# Patient Record
Sex: MALE | Race: ASIAN | NOT HISPANIC OR LATINO | Employment: FULL TIME | URBAN - METROPOLITAN AREA
[De-identification: names, ages, dates, MRNs, and addresses within clinical notes are randomized per-mention and may not be internally consistent; named-entity substitution may affect disease eponyms.]

---

## 2018-06-11 ENCOUNTER — HISTORICAL (OUTPATIENT)
Dept: ADMINISTRATIVE | Facility: HOSPITAL | Age: 33
End: 2018-06-11

## 2018-06-11 LAB
ABS NEUT (OLG): 3.8
ALBUMIN SERPL-MCNC: 4.8 GM/DL (ref 3.4–5)
ALBUMIN/GLOB SERPL: 1.71 {RATIO} (ref 1.5–2.5)
ALP SERPL-CCNC: 66 UNIT/L (ref 38–126)
ALT SERPL-CCNC: 64 UNIT/L (ref 7–52)
AST SERPL-CCNC: 33 UNIT/L (ref 15–37)
BILIRUB SERPL-MCNC: 0.7 MG/DL (ref 0.2–1)
BILIRUBIN DIRECT+TOT PNL SERPL-MCNC: 0.1 MG/DL (ref 0–0.5)
BILIRUBIN DIRECT+TOT PNL SERPL-MCNC: 0.6 MG/DL
BUN SERPL-MCNC: 11 MG/DL (ref 7–18)
CALCIUM SERPL-MCNC: 9 MG/DL (ref 8.5–10)
CHLORIDE SERPL-SCNC: 104 MMOL/L (ref 98–107)
CHOLEST SERPL-MCNC: 206 MG/DL (ref 0–200)
CHOLEST/HDLC SERPL: 4.8 {RATIO}
CO2 SERPL-SCNC: 28 MMOL/L (ref 21–32)
CREAT SERPL-MCNC: 0.82 MG/DL (ref 0.6–1.3)
ERYTHROCYTE [DISTWIDTH] IN BLOOD BY AUTOMATED COUNT: 13.5 % (ref 11.5–17)
GLOBULIN SER-MCNC: 2.8 GM/DL (ref 1.2–3)
GLUCOSE SERPL-MCNC: 102 MG/DL (ref 74–106)
HCT VFR BLD AUTO: 44.9 % (ref 42–52)
HDLC SERPL-MCNC: 43 MG/DL (ref 35–60)
HGB BLD-MCNC: 15.6 GM/DL (ref 14–18)
LDLC SERPL CALC-MCNC: 91 MG/DL (ref 0–129)
LYMPHOCYTES # BLD AUTO: 1.5 X10(3)/MCL (ref 0.6–3.4)
LYMPHOCYTES NFR BLD AUTO: 26.7 % (ref 13–40)
MCH RBC QN AUTO: 31.6 PG (ref 27–31.2)
MCHC RBC AUTO-ENTMCNC: 35 GM/DL (ref 32–36)
MCV RBC AUTO: 91 FL (ref 80–94)
MONOCYTES # BLD AUTO: 0.5 X10(3)/MCL (ref 0–1.8)
MONOCYTES NFR BLD AUTO: 7.8 % (ref 0.1–24)
NEUTROPHILS NFR BLD AUTO: 65.5 % (ref 47–80)
PLATELET # BLD AUTO: 254 X10(3)/MCL (ref 130–400)
PMV BLD AUTO: 9 FL
POTASSIUM SERPL-SCNC: 3.9 MMOL/L (ref 3.5–5.1)
PROT SERPL-MCNC: 7.6 GM/DL (ref 6.4–8.2)
RBC # BLD AUTO: 4.94 X10(6)/MCL (ref 4.7–6.1)
SODIUM SERPL-SCNC: 137 MMOL/L (ref 136–145)
TRIGL SERPL-MCNC: 340 MG/DL (ref 30–150)
TSH SERPL-ACNC: 1.64 MIU/ML (ref 0.35–4.94)
VLDLC SERPL CALC-MCNC: 68 MG/DL
WBC # SPEC AUTO: 5.8 X10(3)/MCL (ref 4.5–11.5)

## 2018-07-03 ENCOUNTER — HISTORICAL (OUTPATIENT)
Dept: ADMINISTRATIVE | Facility: HOSPITAL | Age: 33
End: 2018-07-03

## 2018-07-03 LAB
ALBUMIN SERPL-MCNC: 4.8 GM/DL (ref 3.4–5)
ALP SERPL-CCNC: 60 UNIT/L (ref 38–126)
ALT SERPL-CCNC: 90 UNIT/L (ref 7–52)
AST SERPL-CCNC: 34 UNIT/L (ref 15–37)
BILIRUB SERPL-MCNC: 0.8 MG/DL (ref 0.2–1)
BILIRUBIN DIRECT+TOT PNL SERPL-MCNC: 0.2 MG/DL (ref 0–0.5)
BILIRUBIN DIRECT+TOT PNL SERPL-MCNC: 0.6 MG/DL
PROT SERPL-MCNC: 7.5 GM/DL (ref 6.4–8.2)

## 2018-07-12 ENCOUNTER — HISTORICAL (OUTPATIENT)
Dept: LAB | Facility: HOSPITAL | Age: 33
End: 2018-07-12

## 2018-07-12 LAB
HAV IGM SERPL QL IA: NEGATIVE
HBV CORE IGM SERPL QL IA: NEGATIVE
HBV SURFACE AG SERPL QL IA: NEGATIVE
HCV AB SERPL QL IA: NEGATIVE
HEPATITIS PANEL INTERP: NORMAL

## 2019-07-16 ENCOUNTER — HISTORICAL (OUTPATIENT)
Dept: ADMINISTRATIVE | Facility: HOSPITAL | Age: 34
End: 2019-07-16

## 2019-07-16 LAB
ALBUMIN SERPL-MCNC: 4.9 GM/DL (ref 3.4–5)
ALBUMIN/GLOB SERPL: 1.6 RATIO (ref 1.1–2)
ALP SERPL-CCNC: 70 UNIT/L (ref 50–136)
ALT SERPL-CCNC: 33 UNIT/L (ref 12–78)
AST SERPL-CCNC: 11 UNIT/L (ref 15–37)
BILIRUB SERPL-MCNC: 1.4 MG/DL (ref 0.2–1)
BILIRUBIN DIRECT+TOT PNL SERPL-MCNC: 0.3 MG/DL (ref 0–0.5)
BILIRUBIN DIRECT+TOT PNL SERPL-MCNC: 1.1 MG/DL (ref 0–0.8)
BUN SERPL-MCNC: 11 MG/DL (ref 7–18)
CALCIUM SERPL-MCNC: 9.7 MG/DL (ref 8.5–10.1)
CHLORIDE SERPL-SCNC: 108 MMOL/L (ref 98–107)
CO2 SERPL-SCNC: 30 MMOL/L (ref 21–32)
CREAT SERPL-MCNC: 0.96 MG/DL (ref 0.7–1.3)
GLOBULIN SER-MCNC: 3 GM/DL (ref 2.4–3.5)
GLUCOSE SERPL-MCNC: 89 MG/DL (ref 74–106)
INR PPP: 1.1 (ref 0–1.3)
POTASSIUM SERPL-SCNC: 4.8 MMOL/L (ref 3.5–5.1)
PROT SERPL-MCNC: 7.9 GM/DL (ref 6.4–8.2)
PROTHROMBIN TIME: 14.3 SECOND(S) (ref 12–14)
SODIUM SERPL-SCNC: 144 MMOL/L (ref 136–145)

## 2020-01-20 ENCOUNTER — HISTORICAL (OUTPATIENT)
Dept: ADMINISTRATIVE | Facility: HOSPITAL | Age: 35
End: 2020-01-20

## 2020-01-20 LAB
ALBUMIN SERPL-MCNC: 4.4 GM/DL (ref 3.4–5)
ALBUMIN/GLOB SERPL: 1.4 {RATIO}
ALP SERPL-CCNC: 79 UNIT/L (ref 50–136)
ALT SERPL-CCNC: 91 UNIT/L (ref 12–78)
AST SERPL-CCNC: 20 UNIT/L (ref 15–37)
BILIRUB SERPL-MCNC: 1.4 MG/DL (ref 0.2–1)
BILIRUBIN DIRECT+TOT PNL SERPL-MCNC: 0.2 MG/DL (ref 0–0.2)
BILIRUBIN DIRECT+TOT PNL SERPL-MCNC: 1.2 MG/DL (ref 0–0.8)
BUN SERPL-MCNC: 13 MG/DL (ref 7–18)
CALCIUM SERPL-MCNC: 9.1 MG/DL (ref 8.5–10.1)
CHLORIDE SERPL-SCNC: 106 MMOL/L (ref 98–107)
CO2 SERPL-SCNC: 28 MMOL/L (ref 21–32)
CREAT SERPL-MCNC: 1.01 MG/DL (ref 0.7–1.3)
GLOBULIN SER-MCNC: 3.1 GM/DL (ref 2.4–3.5)
GLUCOSE SERPL-MCNC: 88 MG/DL (ref 74–106)
POTASSIUM SERPL-SCNC: 3.8 MMOL/L (ref 3.5–5.1)
PROT SERPL-MCNC: 7.5 GM/DL (ref 6.4–8.2)
SODIUM SERPL-SCNC: 140 MMOL/L (ref 136–145)

## 2020-02-17 ENCOUNTER — HISTORICAL (OUTPATIENT)
Dept: ADMINISTRATIVE | Facility: HOSPITAL | Age: 35
End: 2020-02-17

## 2020-02-17 LAB
CK SERPL-CCNC: 576 UNIT/L (ref 39–308)
IRON SATN MFR SERPL: 37.4 % (ref 20–50)
IRON SERPL-MCNC: 142 MCG/DL (ref 50–175)
TIBC SERPL-MCNC: 380 MCG/DL (ref 250–450)
TRANSFERRIN SERPL-MCNC: 292 MG/DL (ref 200–360)

## 2020-10-12 ENCOUNTER — HISTORICAL (OUTPATIENT)
Dept: ADMINISTRATIVE | Facility: HOSPITAL | Age: 35
End: 2020-10-12

## 2020-10-12 LAB
ABS NEUT (OLG): 4.7 X10(3)/MCL (ref 2.1–9.2)
ALBUMIN SERPL-MCNC: 5.1 GM/DL (ref 3.4–5)
ALBUMIN/GLOB SERPL: 2.12 {RATIO} (ref 1.5–2.5)
ALP SERPL-CCNC: 65 UNIT/L (ref 38–126)
ALT SERPL-CCNC: 32 UNIT/L (ref 7–52)
ANTINUCLEAR ANTIBODY SCREEN (OHS): NEGATIVE
AST SERPL-CCNC: 21 UNIT/L (ref 15–37)
BILIRUB SERPL-MCNC: 1.2 MG/DL (ref 0.2–1)
BILIRUBIN DIRECT+TOT PNL SERPL-MCNC: 0.2 MG/DL (ref 0–0.5)
BILIRUBIN DIRECT+TOT PNL SERPL-MCNC: 1 MG/DL
BUN SERPL-MCNC: 8 MG/DL (ref 7–18)
CALCIUM SERPL-MCNC: 9.4 MG/DL (ref 8.5–10.1)
CHLORIDE SERPL-SCNC: 103 MMOL/L (ref 98–107)
CHOLEST SERPL-MCNC: 189 MG/DL (ref 0–200)
CHOLEST/HDLC SERPL: 3.5 {RATIO}
CK SERPL-CCNC: 153 UNIT/L (ref 21–232)
CO2 SERPL-SCNC: 25 MMOL/L (ref 21–32)
CREAT SERPL-MCNC: 0.8 MG/DL (ref 0.6–1.3)
DSDNA ANTIBODY (OHS): NEGATIVE
ERYTHROCYTE [DISTWIDTH] IN BLOOD BY AUTOMATED COUNT: 13.2 % (ref 11.5–17)
GLOBULIN SER-MCNC: 2.4 GM/DL (ref 1.2–3)
GLUCOSE SERPL-MCNC: 98 MG/DL (ref 74–106)
HCT VFR BLD AUTO: 44.9 % (ref 42–52)
HDLC SERPL-MCNC: 54 MG/DL (ref 35–60)
HGB BLD-MCNC: 15.7 GM/DL (ref 14–18)
IRON SATN MFR SERPL: 36 % (ref 20–50)
IRON SERPL-MCNC: 123 UG/DL (ref 65–175)
LDLC SERPL CALC-MCNC: 129 MG/DL (ref 0–129)
LYMPHOCYTES # BLD AUTO: 0.9 X10(3)/MCL (ref 0.6–3.4)
LYMPHOCYTES NFR BLD AUTO: 15.1 % (ref 13–40)
MCH RBC QN AUTO: 31.2 PG (ref 27–31.2)
MCHC RBC AUTO-ENTMCNC: 35 GM/DL (ref 32–36)
MCV RBC AUTO: 89 FL (ref 80–94)
MONOCYTES # BLD AUTO: 0.4 X10(3)/MCL (ref 0.1–1.3)
MONOCYTES NFR BLD AUTO: 6.9 % (ref 0.1–24)
NEUTROPHILS NFR BLD AUTO: 78 % (ref 47–80)
PLATELET # BLD AUTO: 247 X10(3)/MCL (ref 130–400)
PMV BLD AUTO: 9.1 FL (ref 9.4–12.4)
POTASSIUM SERPL-SCNC: 3.8 MMOL/L (ref 3.5–5.1)
PROT SERPL-MCNC: 7.5 GM/DL (ref 6.4–8.2)
RBC # BLD AUTO: 5.04 X10(6)/MCL (ref 4.7–6.1)
SODIUM SERPL-SCNC: 140 MMOL/L (ref 136–145)
TIBC SERPL-MCNC: 221 UG/DL (ref 69–240)
TIBC SERPL-MCNC: 344 UG/DL (ref 250–450)
TRANSFERRIN SERPL-MCNC: 299 MG/DL (ref 174–364)
TRIGL SERPL-MCNC: 78 MG/DL (ref 30–150)
TSH SERPL-ACNC: 1.7 MIU/ML (ref 0.35–4.94)
VLDLC SERPL CALC-MCNC: 15.6 MG/DL
WBC # SPEC AUTO: 6 X10(3)/MCL (ref 4.5–11.5)

## 2021-06-24 ENCOUNTER — HISTORICAL (OUTPATIENT)
Dept: ADMINISTRATIVE | Facility: HOSPITAL | Age: 36
End: 2021-06-24

## 2021-06-24 LAB
ALBUMIN SERPL-MCNC: 5 GM/DL (ref 3.4–5)
ALBUMIN/GLOB SERPL: 1.85 {RATIO} (ref 1.5–2.5)
ALP SERPL-CCNC: 65 UNIT/L (ref 38–126)
ALT SERPL-CCNC: 197 UNIT/L (ref 7–52)
AST SERPL-CCNC: 81 UNIT/L (ref 15–37)
BILIRUB SERPL-MCNC: 1.2 MG/DL (ref 0.2–1)
BILIRUBIN DIRECT+TOT PNL SERPL-MCNC: 0.3 MG/DL (ref 0–0.5)
BILIRUBIN DIRECT+TOT PNL SERPL-MCNC: 0.9 MG/DL
BUN SERPL-MCNC: 15 MG/DL (ref 7–18)
CALCIUM SERPL-MCNC: 9.5 MG/DL (ref 8.5–10.1)
CHLORIDE SERPL-SCNC: 104 MMOL/L (ref 98–107)
CO2 SERPL-SCNC: 26 MMOL/L (ref 21–32)
CREAT SERPL-MCNC: 1 MG/DL (ref 0.6–1.3)
GLOBULIN SER-MCNC: 2.8 GM/DL (ref 1.2–3)
GLUCOSE SERPL-MCNC: 88 MG/DL (ref 74–106)
POTASSIUM SERPL-SCNC: 4 MMOL/L (ref 3.5–5.1)
PROT SERPL-MCNC: 7.7 GM/DL (ref 6.4–8.2)
SODIUM SERPL-SCNC: 141 MMOL/L (ref 136–145)
TSH SERPL-ACNC: 2.64 MIU/ML (ref 0.35–4.94)

## 2022-04-10 ENCOUNTER — HISTORICAL (OUTPATIENT)
Dept: ADMINISTRATIVE | Facility: HOSPITAL | Age: 37
End: 2022-04-10

## 2022-04-30 VITALS
HEIGHT: 66 IN | OXYGEN SATURATION: 97 % | DIASTOLIC BLOOD PRESSURE: 82 MMHG | WEIGHT: 158.75 LBS | BODY MASS INDEX: 25.51 KG/M2 | SYSTOLIC BLOOD PRESSURE: 115 MMHG

## 2022-05-02 NOTE — HISTORICAL [facility] CERNER
This is a historical note converted from [employer]. Formatting and pictures may have been removed.  Please reference [employer] for original formatting and attached multimedia. Chief Complaint  CPX FASTING  History of Present Illness  35?year old male presents for wellness visit.  Blood Pressure - 116/78  BMI - 23.84  Immunizations - Influenza vaccine already received  Diet - Balanced overall  Exercise - cardio 2 times weekly  Hx of NAFLD.? Followed by GI, Dr. Tadeo  Asthma - rarely requires albuterol.  History of ADHD diagnosed in 2009 by his pediatrician Dr. Del Rio.? Treated with Vyvanse until 2011.? Patient struggling with work performance due to?working from home. ?Difficulty keeping up with task and staying on task.  Review of Systems  Constitutional:?No weight loss, no fever, no fatigue, no chills, no night sweats,?no weakness  Eyes:?No blurred vision,?no redness,?no drainage,?no ocular?pain  HEENT:?No sore throat,?no ear pain, no sinus pressure, no nasal congestion, no rhinorrhea, no postnasal drip  Respiratory:?No cough, no wheezing, no sputum production, no shortness of breath  Cardiovascular:?No chest pain, no palpitations, no dyspnea on exertion,?no orthopnea  Gastrointestinal:?No nausea, no vomiting, no abdominal pain, no diarrhea,?no constipation, no melena,?no hematochezia  Genitourinary:?No dysuria, no hematuria, no frequency, no urgency, no incontinence, no discharge  Musculoskeletal:?No myalgias, no arthralgias, no weakness, no joint effusion, no edema  Integumentary:?No rashes, no hives, no itching, no lesions, no jaundice  Neurologic:?No headaches, no numbness, no tingling, no weakness, no dizziness  Psychiatric:?No anxiety, no irritability, no depression,?no suicidal ideations, no?homicidal ideations,?no delusions, no hallucinations  Endocrine:?No polyuria, no polydipsia, no polyphagia  Hematology:?No bruising, no lymphadenopathy,?no paleness  ?  Physical Exam  Vitals &  Measurements  HR:?70(Peripheral)? BP:?116/78?  HT:?167.00?cm? WT:?66.500?kg? BMI:?23.84?  General:?Well developed, Well-nourished, in No acute distress, A&O x 4  Eye:?PERRLA, EOMI, Clear conjunctiva, Eyelids unremarkable  Ears:?Bilateral EAC clear?and?Bilateral TM clear  Nose:? Mucosa normal, No rhinorrhea, No lesions  O/P:??No?erythema,?No tonsillar hypertrophy,?No tonsillar exudates,?No cobblestoning  Neck:?Soft, Nontender, No thyromegaly, Full range of motion, No cervical lymphadenopathy, No JVD  Cardiovascular:?Regular rate and rhythm, No murmurs, No gallops, No rubs  Respiratory:?Clear to auscultation bilaterally, No wheezes, No rhonchi, No?crackles  Abdomen:? Soft, Nontender, No hepatosplenomegaly, Normal and equal bowel sounds, No masses, No rebound, No guarding  Musculoskeletal:? No tenderness, FROM, No joint abnormality, No clubbing, No cyanosis,No?edema  Neurologic:? No motor or sensory deficits, Reflexes 2+ throughout, CN II-XII grossly intact  Integumentary:?No rashes or skin lesions  ?  Assessment/Plan  1.?Wellness examination?Z00.00  ?Discussed the?importance of maintaining a balanced diet and regular exercise  Ordered:  Actin (Smooth Muscle) Antibody-[facility] [id number], Routine collect, 10/12/20 14:42:00 CDT, Blood, Order for future visit, Stop date 10/12/20 14:42:00 CDT, Lab Collect, Wellness examination  NAFLD (nonalcoholic fatty liver disease)  Asthma  ADHD - Attention deficit disorder with hyperactivity, Laper...  Alpha-1-Antitrypsin Serum-[facility] [id number], Routine collect, 10/12/20 14:42:00 CDT, Blood, Order for future visit, Stop date 10/12/20 14:42:00 CDT, Lab Collect, Wellness examination  NAFLD (nonalcoholic fatty liver disease)  Asthma  ADHD - Attention deficit disorder with hyperactivity, Laper...  Antinuclear Antibodies ([facility]) Screen, Routine collect, 10/12/20 14:41:00 CDT, Blood, Order for future visit, Stop date 10/12/20 14:41:00 CDT, Lab Collect, Wellness examination  NAFLD  (nonalcoholic fatty liver disease)  Asthma  ADHD - Attention deficit disorder with hyperactivity, Laper...  Ceruloplasmin-[facility] [id number], Routine collect, 10/12/20 14:42:00 CDT, Blood, Order for future visit, Stop date 10/12/20 14:42:00 CDT, Lab Collect, Wellness examination  NAFLD (nonalcoholic fatty liver disease)  Asthma  ADHD - Attention deficit disorder with hyperactivity, Laper...  Comprehensive Metabolic Panel, Routine collect, 10/12/20 14:30:00 CDT, Blood, Stop date 10/12/20 14:30:00 CDT, Lab Collect, Wellness examination, Shwetha WILLARD, Esdras BEARDEN, 10/12/20 14:30:00 CDT  Creatine Kinase, Routine collect, 10/12/20 14:44:00 CDT, Blood, Stop date 10/12/20 14:44:00 CDT, Lab Collect, Wellness examination  NAFLD (nonalcoholic fatty liver disease)  Asthma  ADHD - Attention deficit disorder with hyperactivity, Shwetha WILLARD, Esdras BEARDEN, 10/1...  Flow Cytometry - Leukemia Panel, Routine collect, 10/12/20 14:41:00 CDT, Blood, Order for future visit, Stop date 10/12/20 14:41:00 CDT, Lab Collect, Wellness examination  NAFLD (nonalcoholic fatty liver disease)  Asthma  ADHD - Attention deficit disorder with hyperactivity, Laper...  Iron Binding Capacity Total - Iron Profile, Routine collect, 10/12/20 14:40:00 CDT, Blood, Order for future visit, Stop date 10/12/20 14:40:00 CDT, Lab Collect, Wellness examination  NAFLD (nonalcoholic fatty liver disease)  Asthma  ADHD - Attention deficit disorder with hyperactivity, Laper...  Lipid Panel, Routine collect, 10/12/20 14:30:00 CDT, Blood, Stop date 10/12/20 14:30:00 CDT, Lab Collect, Wellness examination, Esdras Tadeo MD, 10/12/20 14:30:00 CDT  Mitochondrial M2 IgG Antibody-[facility] [id number], Routine collect, 10/12/20 14:41:00 CDT, Blood, Order for future visit, Stop date 10/12/20 14:41:00 CDT, Lab Collect, Wellness examination  NAFLD (nonalcoholic fatty liver disease)  Asthma  ADHD - Attention deficit disorder with hyperactivityBasim...  Preventative Health  Care Est 18-39 years [id number] PC, Wellness examination, [facility] - AFP, 10/12/20 14:21:00 CDT  Thyroid Stimulating Hormone, Routine collect, 10/12/20 14:30:00 CDT, Blood, Stop date 10/12/20 14:30:00 CDT, Lab Collect, Wellness examination, Shwetha WILLARD, Esdras BEARDEN, 10/12/20 14:30:00 CDT  ?  2.?NAFLD (nonalcoholic fatty liver disease)?K76.0  ?Follow-up with GI as scheduled  Ordered:  Actin (Smooth Muscle) Antibody-[facility] [id number], Routine collect, 10/12/20 14:42:00 CDT, Blood, Order for future visit, Stop date 10/12/20 14:42:00 CDT, Lab Collect, Wellness examination  NAFLD (nonalcoholic fatty liver disease)  Asthma  ADHD - Attention deficit disorder with hyperactivity, Laper...  Alpha-1-Antitrypsin Serum-[facility] [id number], Routine collect, 10/12/20 14:42:00 CDT, Blood, Order for future visit, Stop date 10/12/20 14:42:00 CDT, Lab Collect, Wellness examination  NAFLD (nonalcoholic fatty liver disease)  Asthma  ADHD - Attention deficit disorder with hyperactivity, Laper...  Antinuclear Antibodies (KARISSA) Screen, Routine collect, 10/12/20 14:41:00 CDT, Blood, Order for future visit, Stop date 10/12/20 14:41:00 CDT, Lab Collect, Wellness examination  NAFLD (nonalcoholic fatty liver disease)  Asthma  ADHD - Attention deficit disorder with hyperactivity, Laper...  Ceruloplasmin-[facility] [id number], Routine collect, 10/12/20 14:42:00 CDT, Blood, Order for future visit, Stop date 10/12/20 14:42:00 CDT, Lab Collect, Wellness examination  NAFLD (nonalcoholic fatty liver disease)  Asthma  ADHD - Attention deficit disorder with hyperactivity, Laper...  Creatine Kinase, Routine collect, 10/12/20 14:44:00 CDT, Blood, Stop date 10/12/20 14:44:00 CDT, Lab Collect, Wellness examination  NAFLD (nonalcoholic fatty liver disease)  Asthma  ADHD - Attention deficit disorder with hyperactivity, Shwetha WILLARD, Esdras BEARDEN, 10/1...  Flow Cytometry - Leukemia Panel, Routine collect, 10/12/20 14:41:00 CDT, Blood, Order for future visit, Stop date  10/12/20 14:41:00 CDT, Lab Collect, Wellness examination  NAFLD (nonalcoholic fatty liver disease)  Asthma  ADHD - Attention deficit disorder with hyperactivity, Laper...  Iron Binding Capacity Total - Iron Profile, Routine collect, 10/12/20 14:40:00 CDT, Blood, Order for future visit, Stop date 10/12/20 14:40:00 CDT, Lab Collect, Wellness examination  NAFLD (nonalcoholic fatty liver disease)  Asthma  ADHD - Attention deficit disorder with hyperactivity, Laper...  Mitochondrial M2 IgG Antibody-[facility] [id number], Routine collect, 10/12/20 14:41:00 CDT, Blood, Order for future visit, Stop date 10/12/20 14:41:00 CDT, Lab Collect, Wellness examination  NAFLD (nonalcoholic fatty liver disease)  Asthma  ADHD - Attention deficit disorder with hyperactivity, Laper...  ?  3.?Asthma?J45.909  ?Continue?pro-air as needed  Ordered:  Actin (Smooth Muscle) Antibody-[facility] [id number], Routine collect, 10/12/20 14:42:00 CDT, Blood, Order for future visit, Stop date 10/12/20 14:42:00 CDT, Lab Collect, Wellness examination  NAFLD (nonalcoholic fatty liver disease)  Asthma  ADHD - Attention deficit disorder with hyperactivity, Laper...  Alpha-1-Antitrypsin Serum-[facility] [id number], Routine collect, 10/12/20 14:42:00 CDT, Blood, Order for future visit, Stop date 10/12/20 14:42:00 CDT, Lab Collect, Wellness examination  NAFLD (nonalcoholic fatty liver disease)  Asthma  ADHD - Attention deficit disorder with hyperactivity, Laper...  Antinuclear Antibodies (KARISSA) Screen, Routine collect, 10/12/20 14:41:00 CDT, Blood, Order for future visit, Stop date 10/12/20 14:41:00 CDT, Lab Collect, Wellness examination  NAFLD (nonalcoholic fatty liver disease)  Asthma  ADHD - Attention deficit disorder with hyperactivity, Laper...  Ceruloplasmin-[facility] [id number], Routine collect, 10/12/20 14:42:00 CDT, Blood, Order for future visit, Stop date 10/12/20 14:42:00 CDT, Lab Collect, Wellness examination  NAFLD (nonalcoholic fatty liver disease)   Asthma  ADHD - Attention deficit disorder with hyperactivity, Laper...  Creatine Kinase, Routine collect, 10/12/20 14:44:00 CDT, Blood, Stop date 10/12/20 14:44:00 CDT, Lab Collect, Wellness examination  NAFLD (nonalcoholic fatty liver disease)  Asthma  ADHD - Attention deficit disorder with hyperactivity, Shwetha WILLARD, Esdras BEARDEN, 10/1...  Flow Cytometry - Leukemia Panel, Routine collect, 10/12/20 14:41:00 CDT, Blood, Order for future visit, Stop date 10/12/20 14:41:00 CDT, Lab Collect, Wellness examination  NAFLD (nonalcoholic fatty liver disease)  Asthma  ADHD - Attention deficit disorder with hyperactivity, Laper...  Iron Binding Capacity Total - Iron Profile, Routine collect, 10/12/20 14:40:00 CDT, Blood, Order for future visit, Stop date 10/12/20 14:40:00 CDT, Lab Collect, Wellness examination  NAFLD (nonalcoholic fatty liver disease)  Asthma  ADHD - Attention deficit disorder with hyperactivity, Laper...  Mitochondrial M2 IgG Antibody-[facility] [id number], Routine collect, 10/12/20 14:41:00 CDT, Blood, Order for future visit, Stop date 10/12/20 14:41:00 CDT, Lab Collect, Wellness examination  NAFLD (nonalcoholic fatty liver disease)  Asthma  ADHD - Attention deficit disorder with hyperactivity, Laper...  ?  4.?ADHD - Attention deficit disorder with hyperactivity?F90.9  ?Restart Vyvanse at 30 mg daily #15  Ordered:  Actin (Smooth Muscle) Antibody-[facility] [id number], Routine collect, 10/12/20 14:42:00 CDT, Blood, Order for future visit, Stop date 10/12/20 14:42:00 CDT, Lab Collect, Wellness examination  NAFLD (nonalcoholic fatty liver disease)  Asthma  ADHD - Attention deficit disorder with hyperactivity, Laper...  Alpha-1-Antitrypsin Serum-[facility] [id number], Routine collect, 10/12/20 14:42:00 CDT, Blood, Order for future visit, Stop date 10/12/20 14:42:00 CDT, Lab Collect, Wellness examination  NAFLD (nonalcoholic fatty liver disease)  Asthma  ADHD - Attention deficit disorder with hyperactivity,  Laper...  Antinuclear Antibodies (KARISSA) Screen, Routine collect, 10/12/20 14:41:00 CDT, Blood, Order for future visit, Stop date 10/12/20 14:41:00 CDT, Lab Collect, Wellness examination  NAFLD (nonalcoholic fatty liver disease)  Asthma  ADHD - Attention deficit disorder with hyperactivity, Laper...  Ceruloplasmin-[facility] [id number], Routine collect, 10/12/20 14:42:00 CDT, Blood, Order for future visit, Stop date 10/12/20 14:42:00 CDT, Lab Collect, Wellness examination  NAFLD (nonalcoholic fatty liver disease)  Asthma  ADHD - Attention deficit disorder with hyperactivity, Laper...  Creatine Kinase, Routine collect, 10/12/20 14:44:00 CDT, Blood, Stop date 10/12/20 14:44:00 CDT, Lab Collect, Wellness examination  NAFLD (nonalcoholic fatty liver disease)  Asthma  ADHD - Attention deficit disorder with hyperactivity, Shwetha WILLARD, Esdras BEARDEN, 10/1...  Flow Cytometry - Leukemia Panel, Routine collect, 10/12/20 14:41:00 CDT, Blood, Order for future visit, Stop date 10/12/20 14:41:00 CDT, Lab Collect, Wellness examination  NAFLD (nonalcoholic fatty liver disease)  Asthma  ADHD - Attention deficit disorder with hyperactivity, Laper...  Iron Binding Capacity Total - Iron Profile, Routine collect, 10/12/20 14:40:00 CDT, Blood, Order for future visit, Stop date 10/12/20 14:40:00 CDT, Lab Collect, Wellness examination  NAFLD (nonalcoholic fatty liver disease)  Asthma  ADHD - Attention deficit disorder with hyperactivity, Laper...  Mitochondrial M2 IgG Antibody-[facility] [id number], Routine collect, 10/12/20 14:41:00 CDT, Blood, Order for future visit, Stop date 10/12/20 14:41:00 CDT, Lab Collect, Wellness examination  NAFLD (nonalcoholic fatty liver disease)  Asthma  ADHD - Attention deficit disorder with hyperactivity, Laper...  ?  Orders:  albuterol, 90 mcg = 1 puff(s), INH, q6hr, PRN PRN as needed for wheezing, # 1 EA, 0 Refill(s), Pharmacy: [id number], 167, cm, Height/Length Dosing, 10/12/20 13:41:00 CDT,  66.5, kg, Weight Dosing, 10/12/20 13:41:00 CDT  lisdexamfetamine, 30 mg = 1 cap(s), Oral, qAM, # 15 cap(s), 0 Refill(s), Pharmacy: [facility], 167, cm, Height/Length Dosing, 10/12/20 13:41:00 CDT, 66.5, kg, Weight Dosing, 10/12/20 13:41:00 CDT   Problem List/Past Medical History  Ongoing  ADHD - Attention deficit disorder with hyperactivity  Asthma  NAFLD (nonalcoholic fatty liver disease)  Historical  No qualifying data  Medications  multivitamin with minerals (Adult Tab), Oral, Daily  ProAir HFA 90 mcg/inh inhalation aerosol with adapter, 90 mcg= 1 puff(s), INH, q6hr, PRN  Vyvanse 30 mg oral capsule, 30 mg= 1 cap(s), Oral, qAM  Allergies  No Known Allergies  Social History  Abuse/Neglect  No, 10/12/2020  Alcohol - Denies Alcohol Use, 12/24/2012  Current, 1-2 times per month, 06/11/2018  Employment/School  Employed, 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  Diabetes mellitus type 2: Mother.  Hypertension.: Mother.  Immunizations  Vaccine Date Status   influenza virus vaccine, inactivated 10/20/2019 Recorded   influenza virus vaccine, inactivated 10/25/2018 Recorded   tetanus/diphtheria/pertussis, acel(Tdap) 01/14/2018 Recorded   influenza virus vaccine, inactivated 01/14/2018 Recorded   influenza virus vaccine, inactivated 10/22/2011 Recorded   Health Maintenance  Health Maintenance  ???Pending?(in the next year)  ??? ??OverDue  ??? ? ? ?Alcohol Misuse Screening due??01/02/20??and every 1??year(s)  ??? ??Due?  ??? ? ? ?ADL Screening due??10/12/20??and every 1??year(s)  ??? ? ? ?Asthma Management-Asthma Education due??10/12/20??and every 6??month(s)  ??? ? ? ?Asthma Management-Spirometry due??10/12/20??Variable frequency  ??? ? ? ?Asthma Management-[place] Flow due??10/12/20??Variable frequency  ??? ? ? ?Asthma Management-Written Action Plan  due??10/12/20??and every 6??month(s)  ??? ??Due In Future?  ??? ? ? ?Obesity Screening not due until??01/01/21??and every 1??year(s)  ???Satisfied?(in the past 1 year)  ??? ??Satisfied?  ??? ? ? ?Asthma Management-Asthma Medication Prescribed on??10/12/20.??Satisfied by Valerio De La Rosa MD, Richard A  ??? ? ? ?Blood Pressure Screening on??10/12/20.??Satisfied by Sharita Priest LPN  ??? ? ? ?Body Mass Index Check on??10/12/20.??Satisfied by Sharita Priest LPN  ??? ? ? ?Diabetes Screening on??01/20/20.??Satisfied by Blaire Contreras.  ??? ? ? ?Influenza Vaccine on??10/20/19.??Satisfied by Valerio De La Rosa MD, Richard A  ??? ? ? ?Obesity Screening on??10/12/20.??Satisfied by Sharita Priest LPN  ?

## 2023-04-11 PROBLEM — F90.9 ATTENTION DEFICIT HYPERACTIVITY DISORDER (ADHD): Status: ACTIVE | Noted: 2023-04-11

## 2023-04-11 PROBLEM — K76.0 NONALCOHOLIC FATTY LIVER DISEASE: Status: ACTIVE | Noted: 2023-04-11

## 2023-04-11 PROBLEM — J45.909 ASTHMA: Status: ACTIVE | Noted: 2023-04-11

## 2024-01-26 PROBLEM — G47.00 INSOMNIA: Status: ACTIVE | Noted: 2024-01-26

## 2024-01-26 PROBLEM — G47.30 SLEEP APNEA: Status: ACTIVE | Noted: 2024-01-26

## 2024-10-16 ENCOUNTER — PATIENT MESSAGE (OUTPATIENT)
Dept: RESEARCH | Facility: HOSPITAL | Age: 39
End: 2024-10-16